# Patient Record
Sex: FEMALE | Race: BLACK OR AFRICAN AMERICAN | NOT HISPANIC OR LATINO | Employment: OTHER | URBAN - METROPOLITAN AREA
[De-identification: names, ages, dates, MRNs, and addresses within clinical notes are randomized per-mention and may not be internally consistent; named-entity substitution may affect disease eponyms.]

---

## 2017-04-03 ENCOUNTER — HOSPITAL ENCOUNTER (EMERGENCY)
Facility: OTHER | Age: 42
Discharge: HOME | End: 2017-04-03
Attending: EMERGENCY MEDICINE
Payer: MEDICARE

## 2017-04-03 VITALS
SYSTOLIC BLOOD PRESSURE: 128 MMHG | BODY MASS INDEX: 21.71 KG/M2 | HEART RATE: 104 BPM | TEMPERATURE: 99 F | OXYGEN SATURATION: 96 % | RESPIRATION RATE: 18 BRPM | HEIGHT: 61 IN | WEIGHT: 115 LBS | DIASTOLIC BLOOD PRESSURE: 95 MMHG

## 2017-04-03 DIAGNOSIS — T14.90XA INJURY: ICD-10-CM

## 2017-04-03 DIAGNOSIS — S46.911A RIGHT SHOULDER STRAIN, INITIAL ENCOUNTER: Primary | ICD-10-CM

## 2017-04-03 PROCEDURE — 99283 EMERGENCY DEPT VISIT LOW MDM: CPT

## 2017-04-03 RX ORDER — IBUPROFEN 800 MG/1
800 TABLET ORAL EVERY 6 HOURS PRN
Qty: 30 TABLET | Refills: 0 | Status: ON HOLD | OUTPATIENT
Start: 2017-04-03 | End: 2018-07-03

## 2017-04-03 NOTE — ED NOTES
"Pt reports right shoulder pain after "working out". Pt complains of 10 out of 10 pain, no obvious deformity noted. Pt answers questions appropriately  "

## 2017-04-03 NOTE — ED AVS SNAPSHOT
[facility]  [address] [place] [id number]               Constance Seaman   4/3/2017  3:48 PM   ED    Description:  Female : 1975   Department:  [facility]           Your Care was Coordinated By:     Provider Role From To    Fernando Benjamin II, MD Attending Provider 17 1553 --      Reason for Visit     Shoulder Injury           Diagnoses this Visit        Comments    Right shoulder strain, initial encounter    -  Primary     Injury           ED Disposition     None           To Do List           Follow-up Information     Follow up with Roman Tinsley MD In 1 week.    Specialty:  Orthopedic Surgery    Contact information:    [facility] [id number]  [phone]         These Medications        Disp Refills Start End    ibuprofen (ADVIL,MOTRIN) 800 MG tablet 30 tablet 0 4/3/2017     Take 1 tablet (800 mg total) by mouth every 6 (six) hours as needed for Pain. - Oral      [facility]     [facility] Nurse Care Line -  Assistance  Unless otherwise directed by your provider, please contact [facility], our nurse care line that is available for  assistance.     Registered nurses in the [facility] provide: appointment scheduling, clinical advisement, health education, and other advisory services.  Call: [phone] (toll free)               Medications           Message regarding Medications     Verify the changes and/or additions to your medication regime listed below are the same as discussed with your clinician today.  If any of these changes or additions are incorrect, please notify your healthcare provider.        START taking these NEW medications        Refills    ibuprofen (ADVIL,MOTRIN) 800 MG tablet 0    Sig: Take 1 tablet (800 mg total) by mouth every 6 (six) hours as needed for Pain.    Class: Print    Route: Oral      STOP taking these medications     meloxicam  "(MOBIC) 7.5 MG tablet Take 7.5 mg by mouth once daily.           Verify that the below list of medications is an accurate representation of the medications you are currently taking.  If none reported, the list may be blank. If incorrect, please contact your healthcare provider. Carry this list with you in case of emergency.           Current Medications     amlodipine (NORVASC) 10 MG tablet Take 10 mg by mouth once daily.    busPIRone (BUSPAR) 7.5 MG tablet Take 7.5 mg by mouth 3 (three) times daily.    ergocalciferol (ERGOCALCIFEROL) 50,000 unit Cap Take 50,000 Units by mouth every 7 days.    escitalopram oxalate (LEXAPRO) 10 MG tablet Take 10 mg by mouth once daily.    hydrochlorothiazide (HYDRODIURIL) 12.5 MG Tab Take 12.5 mg by mouth once daily.    ibuprofen (ADVIL,MOTRIN) 800 MG tablet Take 1 tablet (800 mg total) by mouth every 6 (six) hours as needed for Pain.    lorazepam (ATIVAN) 0.5 MG tablet Take 1 tablet (0.5 mg total) by mouth every 6 (six) hours as needed for Anxiety.    oxcarbazepine (TRILEPTAL) 600 MG Tab Take 150 mg by mouth once daily.    tramadol (ULTRAM) 50 mg tablet Take 50 mg by mouth every 6 (six) hours as needed for Pain.           Clinical Reference Information           Your Vitals Were     BP Pulse Temp Resp Height Weight    128/95 (BP Location: Left arm, Patient Position: Sitting) 104 98.5 °F (36.9 °C) (Oral) 18 5' 1" (1.549 m) 52.2 kg (115 lb)    SpO2 BMI             96% 21.73 kg/m2         Allergies as of 4/3/2017        Reactions    Avelox [Moxifloxacin] Anaphylaxis      Immunizations Administered on Date of Encounter - 4/3/2017     None      ED Micro, Lab, POCT     Start Ordered       Status Ordering Provider    04/03/17 1555 04/03/17 1550    Once,   Status:  Canceled      Canceled       ED Imaging Orders     Start Ordered       Status Ordering Provider    04/03/17 1555 04/03/17 1551  X-Ray Shoulder Trauma Right  1 time imaging      Final result       Discharge References/Attachments  "    SHOULDER SPRAIN  (ENGLISH)      [facility] Sign-Up     Activating your [facility] account is as easy as 1-2-3!     1) Visit [email or web address], select Sign Up Now, enter this activation code and your date of birth, then select Next.  [email or web address]  Expires: 5/18/2017  4:55 PM      2) Create a username and password to use when you visit [facility] in the future and select a security question in case you lose your password and select Next.    3) Enter your e-mail address and click Sign Up!    Additional Information  If you have questions, please e-mail [email or web address] or call [phone] to talk to our [facility] staff. Remember, [facility] is NOT to be used for urgent needs. For medical emergencies, dial 911.          [facility] complies with applicable Federal civil rights laws and does not discriminate on the basis of race, color, national origin, age, disability, or sex.        Language Assistance Services     ATTENTION: Language assistance services are available, free of charge. Please call [phone].      ATENCIÓN: Si habla español, tiene a holly disposición servicios gratuitos de asistencia lingüística. Llame al [phone].     CHÚ Ý: N?u b?n nói Ti?ng Vi?t, có các d?ch v? h? tr? ngôn ng? mi?n phí dành cho b?n. G?i s? [phone].

## 2017-04-03 NOTE — ED PROVIDER NOTES
"Encounter Date: 4/3/2017    SCRIBE #1 NOTE: I, Mariah Chester, am scribing for, and in the presence of, Dr. Benjamin.       History     Chief Complaint   Patient presents with    Shoulder Injury     pt reports having right shoulder surgery 2016; today while working out right shoulder "popped" out; ROM decreased d/t pain; deformity noted     Review of patient's allergies indicates:   Allergen Reactions    Avelox [moxifloxacin] Anaphylaxis     HPI Comments:   Time seen by provider: 4:17 PM    The patient is a 41 y.o. female with HTN, bipolar disorder, depression, and anxiety who presents to the ED with right shoulder injury. She states that her shoulder "popped" out while doing "rough" exercises this morning. The patient denies having an upcoming Orthopedic appointment, arthralgias, myalgias, or any other symptoms at this time. Her PCP is Dr. Radha Moreno. She has a SHx including a rotator cuff repair that was performed by Dr. Oquendo 2 years ago.    The history is provided by the patient.     Past Medical History:   Diagnosis Date    Anxiety     Depression     Hypertension      Past Surgical History:   Procedure Laterality Date    ABDOMINAL SURGERY       SECTION      choleycystectomy      COSMETIC SURGERY      HYSTERECTOMY      ROTATOR CUFF REPAIR       History reviewed. No pertinent family history.  Social History   Substance Use Topics    Smoking status: Never Smoker    Smokeless tobacco: None    Alcohol use No     Review of Systems   Constitutional: Negative for chills and fever.   HENT: Negative for nosebleeds.    Eyes: Negative for visual disturbance.   Respiratory: Negative for cough and shortness of breath.    Cardiovascular: Negative for chest pain and palpitations.   Gastrointestinal: Negative for abdominal pain, diarrhea, nausea and vomiting.   Genitourinary: Negative for dysuria and hematuria.   Musculoskeletal: Negative for arthralgias (right shoulder) and myalgias.        " Positive for injury (right shoulder).   Skin: Negative for rash.   Neurological: Negative for seizures, syncope and headaches.     Physical Exam   Initial Vitals   BP Pulse Resp Temp SpO2   04/03/17 1546 04/03/17 1546 04/03/17 1546 04/03/17 1546 04/03/17 1546   128/95 104 18 98.5 °F (36.9 °C) 96 %     Physical Exam    Nursing note and vitals reviewed.  Constitutional: She appears well-developed and well-nourished. She is not diaphoretic. No distress.   HENT:   Head: Normocephalic and atraumatic.   Mouth/Throat: Oropharynx is clear and moist.   Eyes: Conjunctivae are normal.   Neck: Neck supple.   Abdominal: Soft. She exhibits no distension. There is no tenderness.   Musculoskeletal: Normal range of motion.        Right shoulder: She exhibits normal range of motion, no tenderness and no deformity.   Full ROM of both active and passive. No focal bony tenderness, ecchymosis, muscular tenderness, or unusual bulging of bicep muscle.   Neurological: She is alert and oriented to person, place, and time.   Skin: No rash noted. No erythema.   Psychiatric:   Patient has unusual affect. She has loose associations but no AH/VH.        ED Course   Procedures   Imaging Results         X-Ray Shoulder Trauma Right (Final result) Result time:  04/03/17 16:25:57    Final result by Garrison Perry III, MD (04/03/17 16:25:57)    Narrative:    3 views: No fracture dislocation bone destruction seen.      Electronically signed by: GARRISON PERRY  Date:     04/03/17  Time:    16:25                X-Rays:   Independently Interpreted Readings:   Other Readings:  XR Right Shoulder: No fracture. Good alignment.     Medical Decision Making:   History:   Old Medical Records: I decided to obtain old medical records.  Clinical Tests:   Radiological Study: Ordered and Reviewed            Scribe Attestation:   Scribe #1: I performed the above scribed service and the documentation accurately describes the services I performed. I attest to the  "accuracy of the note.    Attending Attestation:           Physician Attestation for Scribe:  Physician Attestation Statement for Scribe #1: I, Dr. Benjamin, reviewed documentation, as scribed by Mariah Chester in my presence, and it is both accurate and complete.                 ED Course     Patient presents with right shoulder pain which occurred while she was doing exercises.  She reports hearing/feeling a "popping".  She had rotator cuff surgery on this nearly 2 years ago.  No longer follows with her orthopedist.  On exam the patient does not have any focal bony tenderness.  She also does not have exam that would suggest biceps tendon rupture, although this cannot be entirely excluded, nor can other tendon/soft tissue injury.  X-ray negative for fracture, dislocation.  The patient has good range of motion.  I do not think she needs immobilization.  Recommend anti-inflammatories, follow-up with orthopedics or primary care, especially symptoms persist    Clinical Impression:     1. Right shoulder strain, initial encounter    2. Injury          Disposition:   Disposition: Discharged  Condition: Stable       Fernando Benjamin II, MD  04/03/17 4412    "

## 2017-12-16 ENCOUNTER — HOSPITAL ENCOUNTER (EMERGENCY)
Facility: HOSPITAL | Age: 42
Discharge: HOME | End: 2017-12-16
Attending: EMERGENCY MEDICINE
Payer: MEDICARE

## 2017-12-16 VITALS
OXYGEN SATURATION: 100 % | BODY MASS INDEX: 21.71 KG/M2 | DIASTOLIC BLOOD PRESSURE: 87 MMHG | RESPIRATION RATE: 16 BRPM | SYSTOLIC BLOOD PRESSURE: 162 MMHG | HEART RATE: 91 BPM | HEIGHT: 61 IN | TEMPERATURE: 99 F | WEIGHT: 115 LBS

## 2017-12-16 DIAGNOSIS — M25.511 CHRONIC RIGHT SHOULDER PAIN: Primary | ICD-10-CM

## 2017-12-16 DIAGNOSIS — G89.29 CHRONIC RIGHT SHOULDER PAIN: Primary | ICD-10-CM

## 2017-12-16 PROCEDURE — 25000003 PHARM REV CODE 250: Performed by: EMERGENCY MEDICINE

## 2017-12-16 PROCEDURE — 99284 EMERGENCY DEPT VISIT MOD MDM: CPT | Mod: ,,, | Performed by: EMERGENCY MEDICINE

## 2017-12-16 PROCEDURE — 99283 EMERGENCY DEPT VISIT LOW MDM: CPT

## 2017-12-16 RX ORDER — QUETIAPINE FUMARATE 100 MG/1
100 TABLET, FILM COATED ORAL DAILY
Qty: 30 TABLET | Refills: 0 | Status: ON HOLD | OUTPATIENT
Start: 2017-12-16 | End: 2018-07-03

## 2017-12-16 RX ORDER — LORAZEPAM 0.5 MG/1
0.5 TABLET ORAL
Status: COMPLETED | OUTPATIENT
Start: 2017-12-16 | End: 2017-12-16

## 2017-12-16 RX ORDER — AMLODIPINE BESYLATE 5 MG/1
5 TABLET ORAL DAILY
Qty: 30 TABLET | Refills: 0 | Status: SHIPPED | OUTPATIENT
Start: 2017-12-16 | End: 2018-12-16

## 2017-12-16 RX ADMIN — LORAZEPAM 0.5 MG: 0.5 TABLET ORAL at 12:12

## 2017-12-16 NOTE — ED PROVIDER NOTES
"Encounter Date: 2017    SCRIBE #1 NOTE: I, Wang Traylor, am scribing for, and in the presence of,  Dr. Ann. I have scribed the entire note.   SCRIBE #2 NOTE: I, Abdullahi Perdue, am scribing for, and in the presence of, Dr. Ann.     History     Chief Complaint   Patient presents with    Abscess     Time patient was seen by the provider: 12:00 PM       The patient is a 42 y.o. female with hx of: depression, anxiety, right rotator cuff repair surgery (), and HTN who presents to the ED with complaint of right shoulder pain described as a "knot in her right shoulder" with initial onset about 1 year ago, but worse for the last month. Patient describes tearing pain along her right side, associated with sensation of fluid draining along her right side, which the patient claims ultimately drains from her vagina. Patient states that the drainage is clear, increases after drinking water, and she is unsure whether or not this is urine. She also endorses developing right shoulder and right jaw tightness whenever she chews. She also states developing a small area of fluid collection to the her right face, which she states that she manually opened and drained 4 days ago. Patient denies new trauma to her right shoulder. Patient denies any urinary problems and N/D/V. Patient reports that Nyquil alleviates the sensation of drainage. She states that she has been off her Seroquel for 1 month.       The history is provided by the patient and medical records.     Review of patient's allergies indicates:   Allergen Reactions    Avelox [moxifloxacin] Anaphylaxis     Past Medical History:   Diagnosis Date    Anxiety     Depression     Hypertension      Past Surgical History:   Procedure Laterality Date    ABDOMINAL SURGERY       SECTION      choleycystectomy      COSMETIC SURGERY      HYSTERECTOMY      ROTATOR CUFF REPAIR       History reviewed. No pertinent family history.  Social History   Substance Use Topics "    Smoking status: Never Smoker    Smokeless tobacco: Never Used    Alcohol use No     Review of Systems   Constitutional: Negative for fever.   HENT: Negative for sore throat.         Swelling in right side of jaw   Respiratory: Negative for shortness of breath.    Cardiovascular: Negative for chest pain.   Gastrointestinal: Negative for diarrhea, nausea and vomiting.   Genitourinary: Negative for dysuria and vaginal discharge.   Musculoskeletal: Negative for back pain.        Subjective right shoulder pain. Subjective right flank/ribs pain.   Skin: Negative for rash.   Neurological: Negative for weakness.   Hematological: Does not bruise/bleed easily.       Physical Exam       Initial Vitals [12/16/17 1101]   BP Pulse Resp Temp SpO2   (!) 162/87 (!) 115 16 98.8 °F (37.1 °C) 100 %      MAP       112         Physical Exam    Nursing note and vitals reviewed.    Appearance: Anxious and tearful.  Skin: 1 cm diameter of scab on right cheek, no tenderness to palpitation. No fluctuance. No surrounding redness.   Eyes: No conjunctival injection.  ENT: Oropharynx clear. No swelling on inside of the cheek or gingival swelling or redness. No swelling or abscess.   Chest: Clear to auscultation bilaterally.  Good air movement.  No wheezes.  No rhonchi.  Cardiovascular: Regular rate and rhythm.  No murmurs. No gallops. No rubs.  Abdomen: Soft.  Not distended.  Nontender.  No guarding.  No rebound. No Masses.  Musculoskeletal: Good range of motion all joints.  No deformities.  Neck supple.  No meningismus. Jaw opens and closes with no difficulty. No bone abnormalities.  Right shoulder with full ROM, no deformity, no obvious swelling or fluid collection.  Neurologic: Motor intact.  Sensation intact.  Cerebellar intact.  Cranial nerves intact.  Mental Status:  Alert and oriented x 3.  Appropriate, conversant.    ED Course   Procedures  Labs Reviewed - No data to display              Medical Decision Making:   History:   Old  Medical Records: I decided to obtain old medical records.  Initial Assessment:   42 y.o. female with multiple complaints centralized around right shoulder pain, and sensation of fluid draining from her right shoulder and jaw. There is no sign of infection in either the jaw or shoulder and no sign of drainage anywhere else. Patient claims that she is having drainage of this fluid from her vagina, but denies any extra liquid coming out of her vagina, denies any other urinary symptoms, signs of infection, pelvic pain, or anything that would suggest a gynecological etiology. I do not think that her shoulder pain is related to any sort of vaginal discharge. Patient is slightly tachycardic likely from anxiety and crying. She knows that she has been off of her Seroquel for 1 month and has been taking Lexapro intermittently. Has an appointment with her PCP soon, but is requesting refill of her Amlodipine and Seroquel which I think is reasonable at this time. She is also requesting Ativan in the ED for her anxiety and shoulder pain. Otherwise she is hemodynamically stable. Advised patient to keep her appointment with her PCP on Tuesday. Also advised to follow with Orthopedics. Patient is amenable to this plan. Will discharge home. Advised pt to follow up with PCP or return if concerning symptoms arise. Pt understands and agrees with plan. Will d/c home.              Scribe Attestation:   Scribe #1: I performed the above scribed service and the documentation accurately describes the services I performed. I attest to the accuracy of the note.  Scribe #2: I performed the above scribed service and the documentation accurately describes the services I performed. I attest to the accuracy of the note.            ED Course      Clinical Impression:   The encounter diagnosis was Chronic right shoulder pain.    Disposition:   Disposition: Discharged  Condition: Stable                        Berhane Ann MD  12/16/17 2243

## 2017-12-16 NOTE — ED TRIAGE NOTES
"Presents to ER with complaint of:  A knot in her right shoulder that she massages to make it drain then it comes out of her vagina."  Patient noted to have a scabbed over lesion noted to her right cheek area.      GENERAL: The patient is a small framed disheveled appearing female in no apparent distress. Alert and oriented x4.  Crying and anxious.                                                HEENT: Head is normocephalic and atraumatic. Extraocular muscles are intact. Pupils are equal, round, and reactive to light and accommodation. Nares appeared normal. Mouth is well hydrated and without lesions. Mucous membranes are moist. Posterior pharynx clear of any exudate or lesions.    NECK: Supple. No carotid bruits. No lymphadenopathy or thyromegaly.    LUNGS: Clear to auscultation.    HEART: Regular rate and rhythm without murmur.     ABDOMEN: Soft, nontender, and nondistended. Positive bowel sounds. No hepatosplenomegaly was noted.     EXTREMITIES: Without any cyanosis, clubbing, rash, lesions or edema.     NEUROLOGIC: Cranial nerves II through XII are grossly intact.     PSYCHIATRIC: Flat affect, but denies suicidal or homicidal ideations.    SKIN: No ulceration or induration present.                                                "

## 2018-07-02 ENCOUNTER — HOSPITAL ENCOUNTER (EMERGENCY)
Facility: HOSPITAL | Age: 43
Discharge: PSYCHIATRIC OR SUBSTANCE USE FACILITY | End: 2018-07-02
Attending: EMERGENCY MEDICINE
Payer: MEDICARE

## 2018-07-02 VITALS
DIASTOLIC BLOOD PRESSURE: 66 MMHG | TEMPERATURE: 98 F | WEIGHT: 104 LBS | OXYGEN SATURATION: 100 % | RESPIRATION RATE: 18 BRPM | HEART RATE: 95 BPM | HEIGHT: 61 IN | BODY MASS INDEX: 19.63 KG/M2 | SYSTOLIC BLOOD PRESSURE: 156 MMHG

## 2018-07-02 DIAGNOSIS — Z00.8 MEDICAL CLEARANCE FOR PSYCHIATRIC ADMISSION: ICD-10-CM

## 2018-07-02 DIAGNOSIS — R10.2 PELVIC PAIN: ICD-10-CM

## 2018-07-02 DIAGNOSIS — M25.519 SHOULDER PAIN: ICD-10-CM

## 2018-07-02 DIAGNOSIS — F23 ACUTE PSYCHOSIS: Primary | ICD-10-CM

## 2018-07-02 PROBLEM — F31.10 BIPOLAR I DISORDER WITH MANIA: Status: ACTIVE | Noted: 2018-07-02

## 2018-07-02 PROBLEM — F29 PSYCHOSIS: Status: ACTIVE | Noted: 2018-07-02

## 2018-07-02 LAB
ALBUMIN SERPL BCP-MCNC: 4.2 G/DL
ALP SERPL-CCNC: 60 U/L
ALT SERPL W/O P-5'-P-CCNC: 24 U/L
AMPHET+METHAMPHET UR QL: NEGATIVE
ANION GAP SERPL CALC-SCNC: 10 MMOL/L
APAP SERPL-MCNC: <3 UG/ML
AST SERPL-CCNC: 21 U/L
BARBITURATES UR QL SCN>200 NG/ML: NEGATIVE
BASOPHILS # BLD AUTO: 0.03 K/UL
BASOPHILS NFR BLD: 0.7 %
BENZODIAZ UR QL SCN>200 NG/ML: NEGATIVE
BILIRUB SERPL-MCNC: 0.4 MG/DL
BILIRUB UR QL STRIP: NEGATIVE
BUN SERPL-MCNC: 9 MG/DL
BZE UR QL SCN: NEGATIVE
CALCIUM SERPL-MCNC: 10.1 MG/DL
CANNABINOIDS UR QL SCN: NORMAL
CHLORIDE SERPL-SCNC: 108 MMOL/L
CK SERPL-CCNC: 86 U/L
CLARITY UR REFRACT.AUTO: ABNORMAL
CO2 SERPL-SCNC: 25 MMOL/L
COLOR UR AUTO: ABNORMAL
CREAT SERPL-MCNC: 0.8 MG/DL
CREAT UR-MCNC: 44 MG/DL
DIFFERENTIAL METHOD: ABNORMAL
EOSINOPHIL # BLD AUTO: 0.1 K/UL
EOSINOPHIL NFR BLD: 1.3 %
ERYTHROCYTE [DISTWIDTH] IN BLOOD BY AUTOMATED COUNT: 13.6 %
EST. GFR  (AFRICAN AMERICAN): >60 ML/MIN/1.73 M^2
EST. GFR  (NON AFRICAN AMERICAN): >60 ML/MIN/1.73 M^2
ETHANOL SERPL-MCNC: <10 MG/DL
GLUCOSE SERPL-MCNC: 80 MG/DL
GLUCOSE UR QL STRIP: NEGATIVE
HCT VFR BLD AUTO: 36.9 %
HGB BLD-MCNC: 12.3 G/DL
HGB UR QL STRIP: NEGATIVE
IMM GRANULOCYTES # BLD AUTO: 0.01 K/UL
IMM GRANULOCYTES NFR BLD AUTO: 0.2 %
KETONES UR QL STRIP: NEGATIVE
LEUKOCYTE ESTERASE UR QL STRIP: NEGATIVE
LYMPHOCYTES # BLD AUTO: 1.8 K/UL
LYMPHOCYTES NFR BLD: 40.5 %
MCH RBC QN AUTO: 30.5 PG
MCHC RBC AUTO-ENTMCNC: 33.3 G/DL
MCV RBC AUTO: 92 FL
METHADONE UR QL SCN>300 NG/ML: NEGATIVE
MONOCYTES # BLD AUTO: 0.3 K/UL
MONOCYTES NFR BLD: 6.2 %
NEUTROPHILS # BLD AUTO: 2.3 K/UL
NEUTROPHILS NFR BLD: 51.1 %
NITRITE UR QL STRIP: NEGATIVE
NRBC BLD-RTO: 0 /100 WBC
OPIATES UR QL SCN: NEGATIVE
PCP UR QL SCN>25 NG/ML: NEGATIVE
PH UR STRIP: 7 [PH] (ref 5–8)
PLATELET # BLD AUTO: 352 K/UL
PMV BLD AUTO: 11.2 FL
POTASSIUM SERPL-SCNC: 3.4 MMOL/L
PROT SERPL-MCNC: 7.9 G/DL
PROT UR QL STRIP: NEGATIVE
RBC # BLD AUTO: 4.03 M/UL
SALICYLATES SERPL-MCNC: <5 MG/DL
SODIUM SERPL-SCNC: 143 MMOL/L
SP GR UR STRIP: 1.01 (ref 1–1.03)
TOXICOLOGY INFORMATION: NORMAL
TSH SERPL DL<=0.005 MIU/L-ACNC: 0.46 UIU/ML
URN SPEC COLLECT METH UR: ABNORMAL
UROBILINOGEN UR STRIP-ACNC: NEGATIVE EU/DL
WBC # BLD AUTO: 4.52 K/UL

## 2018-07-02 PROCEDURE — 81003 URINALYSIS AUTO W/O SCOPE: CPT | Mod: 59

## 2018-07-02 PROCEDURE — 80307 DRUG TEST PRSMV CHEM ANLYZR: CPT

## 2018-07-02 PROCEDURE — 80329 ANALGESICS NON-OPIOID 1 OR 2: CPT

## 2018-07-02 PROCEDURE — 80320 DRUG SCREEN QUANTALCOHOLS: CPT

## 2018-07-02 PROCEDURE — 80053 COMPREHEN METABOLIC PANEL: CPT

## 2018-07-02 PROCEDURE — 85025 COMPLETE CBC W/AUTO DIFF WBC: CPT

## 2018-07-02 PROCEDURE — 25000003 PHARM REV CODE 250: Performed by: EMERGENCY MEDICINE

## 2018-07-02 PROCEDURE — 99284 EMERGENCY DEPT VISIT MOD MDM: CPT | Mod: ,,, | Performed by: EMERGENCY MEDICINE

## 2018-07-02 PROCEDURE — 93010 ELECTROCARDIOGRAM REPORT: CPT | Mod: ,,, | Performed by: INTERNAL MEDICINE

## 2018-07-02 PROCEDURE — 96372 THER/PROPH/DIAG INJ SC/IM: CPT

## 2018-07-02 PROCEDURE — 84443 ASSAY THYROID STIM HORMONE: CPT

## 2018-07-02 PROCEDURE — 99285 EMERGENCY DEPT VISIT HI MDM: CPT | Mod: 25

## 2018-07-02 PROCEDURE — 63600175 PHARM REV CODE 636 W HCPCS: Performed by: EMERGENCY MEDICINE

## 2018-07-02 PROCEDURE — 93005 ELECTROCARDIOGRAM TRACING: CPT

## 2018-07-02 PROCEDURE — 82550 ASSAY OF CK (CPK): CPT

## 2018-07-02 RX ORDER — LORAZEPAM 1 MG/1
1 TABLET ORAL EVERY 4 HOURS PRN
Status: DISCONTINUED | OUTPATIENT
Start: 2018-07-02 | End: 2018-07-02 | Stop reason: HOSPADM

## 2018-07-02 RX ORDER — POTASSIUM CHLORIDE 20 MEQ/1
40 TABLET, EXTENDED RELEASE ORAL
Status: DISCONTINUED | OUTPATIENT
Start: 2018-07-02 | End: 2018-07-02 | Stop reason: HOSPADM

## 2018-07-02 RX ORDER — HALOPERIDOL 5 MG/ML
5 INJECTION INTRAMUSCULAR EVERY 6 HOURS PRN
Status: DISCONTINUED | OUTPATIENT
Start: 2018-07-02 | End: 2018-07-02 | Stop reason: HOSPADM

## 2018-07-02 RX ADMIN — LORAZEPAM 1 MG: 1 TABLET ORAL at 03:07

## 2018-07-02 RX ADMIN — HALOPERIDOL LACTATE 5 MG: 5 INJECTION, SOLUTION INTRAMUSCULAR at 03:07

## 2018-07-02 NOTE — HPI
"Ms. Seaman 41 y/o female with history of HTN, Bipolar disorder, depression, anxiety presents to the ER  With symptomatic complaints, manic, and psychotic. The pt is sitting in a dark room complaining of a migraine and crying prior to interview. She reports coming to the hospital via her daughter with a "broken jaw, fractured clavicle, and broken sacrum". The pt complains of her symptoms being dismissed and is "angry" that she is going to be PEC'd. She endorses a past psychiatric history of Major Depression and Borderline Personality Disorder. The pt is elevated, exhibits pressured speech, flight of ideas, and crying throughout interview. She insists on "doing exercises and stretching her pelvis" during our conversation. The pt denies taking psychotropic medications and does not have an outpatient psychiatrist. She reports poor sleep, anger, and expresses persecutory beliefs about hospitals, doctors, and staff.  "

## 2018-07-02 NOTE — CONSULTS
Please see consult note by Dr. Vik Cohen MD  Psychiatry  [facility]

## 2018-07-02 NOTE — ED NOTES
Patient given her tray.  She did not want it.  Cont to lay in bed.  Patient also informed where she would be discharged.  She appeared irritated.  Will cont to monitor.

## 2018-07-02 NOTE — ED NOTES
Patient transported to via wheelchair with 2 security and nurse escort.  Patient belongings sent with patient and PEC.

## 2018-07-02 NOTE — ED NOTES
Admit packet faxed to [facility], [facility], and [facility]

## 2018-07-02 NOTE — ED TRIAGE NOTES
"Pt flight of ideas and tearful during triage. Pt states "Thesupression of my medication is causing my pelvic to move from where it's supposed to be" and "There are arm nerves tat are wrapping around my brain" and "There are balls in my arms that are forcing me to move my arms".  Pt c/o right sided back pain. Pt denies any SI or HI at this time. Pt states she is not taking any of her prescribed medications. Pt denies any depression or anxiety at this time. Pt A&O x4 during triage. Pt talking about multiple c/o and unable to stay on track despite redirection. Pt denies any weakness or numbness anywhere. Denies any chest pain, SOB, fever, chills, N/D/V. Pt moving repeatedly during triage; Unable to sit still. Pt ambulating per self without assistance.   "

## 2018-07-02 NOTE — SUBJECTIVE & OBJECTIVE
Patient History           Medical as of 2018     Past Medical History     Diagnosis Date Comments Source    Anxiety -- -- Provider    Depression -- -- Provider    Hypertension -- -- Provider          Pertinent Negatives     Diagnosis Date Noted Comments Source    Cancer 2016 -- Provider    CHF (congestive heart failure) 2016 -- Provider    COPD (chronic obstructive pulmonary disease) 2016 -- Provider    Diabetes mellitus 2016 -- Provider    Renal disorder 2016 -- Provider    Seizures 2016 -- Provider    Stroke 2016 -- Provider                  Surgical as of 2018     Past Surgical History     Procedure Laterality Date Comments Source    HYSTERECTOMY -- -- -- Provider     SECTION -- -- -- Provider    ABDOMINAL SURGERY -- -- -- Provider    COSMETIC SURGERY -- -- -- Provider    choleycystectomy [Other] -- -- -- Provider    ROTATOR CUFF REPAIR -- -- -- Provider                  Family as of 2018    **None**           Tobacco Use as of 2018     Smoking Status Smoking Start Date Smoking Quit Date Packs/day Years Used    Never Smoker -- -- -- --    Types Comments Smokeless Tobacco Status Smokeless Tobacco Quit Date Source    -- -- Never Used -- Provider            Alcohol Use as of 2018     Alcohol Use Drinks/Week Alcohol/Week Comments Source    No -- -- -- Provider            Drug Use as of 2018     Drug Use Types Frequency Comments Source    Yes  Marijuana -- twice daily Provider            Sexual Activity as of 2018     Sexually Active Birth Control Partners Comments Source    -- -- -- -- Provider            Activities of Daily Living as of 2018    **None**           Social Documentation as of 2018    **None**           Occupational as of 2018    **None**           Socioeconomic as of 2018     Marital Status Spouse Name Number of Children Years Education Preferred Language Ethnicity Race Source     -- -- -- English  /Black Black or  --         Pertinent History Q A Comments    as of 2018 Lives with      Place in Birth Order      Lives in      Number of Siblings      Raised by      Legal Involvement      Childhood Trauma      Criminal History of      Financial Status      Highest Level of Education      Does patient have access to a firearm?       Service      Primary Leisure Activity      Spirituality       Past Medical History:   Diagnosis Date    Anxiety     Depression     Hypertension      Past Surgical History:   Procedure Laterality Date    ABDOMINAL SURGERY       SECTION      choleycystectomy      COSMETIC SURGERY      HYSTERECTOMY      ROTATOR CUFF REPAIR       Family History     None        Social History Main Topics    Smoking status: Never Smoker    Smokeless tobacco: Never Used    Alcohol use No    Drug use: Yes     Types: Marijuana      Comment: twice daily    Sexual activity: Not on file     Review of patient's allergies indicates:   Allergen Reactions    Avelox [moxifloxacin] Anaphylaxis       No current facility-administered medications on file prior to encounter.      Current Outpatient Prescriptions on File Prior to Encounter   Medication Sig    amLODIPine (NORVASC) 5 MG tablet Take 1 tablet (5 mg total) by mouth once daily.    busPIRone (BUSPAR) 7.5 MG tablet Take 7.5 mg by mouth 3 (three) times daily.    ergocalciferol (ERGOCALCIFEROL) 50,000 unit Cap Take 50,000 Units by mouth every 7 days.    escitalopram oxalate (LEXAPRO) 10 MG tablet Take 10 mg by mouth once daily.    hydrochlorothiazide (HYDRODIURIL) 12.5 MG Tab Take 12.5 mg by mouth once daily.    ibuprofen (ADVIL,MOTRIN) 800 MG tablet Take 1 tablet (800 mg total) by mouth every 6 (six) hours as needed for Pain.    lorazepam (ATIVAN) 0.5 MG tablet Take 1 tablet (0.5 mg total) by mouth every 6 (six) hours as needed for Anxiety.    oxcarbazepine (TRILEPTAL) 600 MG Tab Take 150 mg  "by mouth once daily.    QUEtiapine (SEROQUEL) 100 MG Tab Take 1 tablet (100 mg total) by mouth once daily.    tramadol (ULTRAM) 50 mg tablet Take 50 mg by mouth every 6 (six) hours as needed for Pain.     Psychotherapeutics     Start     Stop Route Frequency Ordered    07/02/18 1531  LORazepam tablet 1 mg      -- Oral Every 4 hours PRN 07/02/18 1431    07/02/18 1531  haloperidol lactate injection 5 mg      -- IM Every 6 hours PRN 07/02/18 1431        Review of Systems   All other systems reviewed and are negative.    Strengths and Liabilities: Liability: Patient is defensive., Liability: Patient is impulsive., Liability: Patient has poor judgment    Objective:     Vital Signs (Most Recent):  Temp: 98.5 °F (36.9 °C) (07/02/18 1440)  Pulse: 80 (07/02/18 1440)  Resp: 17 (07/02/18 1440)  BP: 127/68 (07/02/18 1440)  SpO2: 100 % (07/02/18 1440) Vital Signs (24h Range):  Temp:  [98.3 °F (36.8 °C)-98.5 °F (36.9 °C)] 98.5 °F (36.9 °C)  Pulse:  [80-98] 80  Resp:  [17] 17  SpO2:  [100 %] 100 %  BP: (127-137)/(68-72) 127/68     Height: 5' 1" (154.9 cm)  Weight: 47.2 kg (104 lb)  Body mass index is 19.65 kg/m².    No intake or output data in the 24 hours ending 07/02/18 1552    Physical Exam   Psychiatric:   Mental Status Exam:  Appearance: disheveled, lying in bed  Behavior/Cooperation: limited/ appropriate reluctant to participate, psychomotor agitation, restless and fidgety   Speech: appropriate rate, volume and tone loud, pressured, rapid  Language: uses words appropriately; NO aphasia or dysarthria  Mood: anxious, irritable  Affect:  congruent with mood and appropriate to situation/content   Thought Process: flight of ideas, racing, illogical  Thought Content: delusions:believes she has multiple fractures yes, Denies SI/HI/AVH  Level of Consciousness: Alert and Oriented x3  Memory:  Intact to conversation  Fund of Knowledge: appears adequate  Insight: Impaired  Judgment: Impaired          Significant Labs:   Last 24 " Hours:   Recent Lab Results       07/02/18  1259 07/02/18  1246      Benzodiazepines Negative      Methadone metabolites Negative      Phencyclidine Negative      Immature Granulocytes  0.2     Immature Grans (Abs)  0.01  Comment:  Mild elevation in immature granulocytes is non specific and   can be seen in a variety of conditions including stress response,   acute inflammation, trauma and pregnancy. Correlation with other   laboratory and clinical findings is essential.       Acetaminophen (Tylenol), Serum  <3.0  Comment:  Toxic Levels:  Adults (4 hr post-ingestion).........>150 ug/mL  Adults (12 hr post-ingestion)........>40 ug/mL  Peds (2 hr post-ingestion, liquid)...>225 ug/mL  (L)     Albumin  4.2     Alcohol, Medical, Serum  <10     Alkaline Phosphatase  60     ALT  24     Amphetamine Screen, Ur Negative      Anion Gap  10     Appearance, UA Hazy(A)      AST  21     Barbiturate Screen, Ur Negative      Baso #  0.03     Basophil%  0.7     Bilirubin (UA) Negative      Total Bilirubin  0.4  Comment:  For infants and newborns, interpretation of results should be based  on gestational age, weight and in agreement with clinical  observations.  Premature Infant recommended reference ranges:  Up to 24 hours.............<8.0 mg/dL  Up to 48 hours............<12.0 mg/dL  3-5 days..................<15.0 mg/dL  6-29 days.................<15.0 mg/dL       BUN, Bld  9     Calcium  10.1     Chloride  108     CO2  25     Cocaine (Metab.) Negative      Color, UA Straw      CPK  86     Creatinine  0.8     Creatinine, Random Ur 44.0  Comment:  The random urine reference ranges provided were established   for 24 hour urine collections.  No reference ranges exist for  random urine specimens.  Correlate clinically.        Differential Method  Automated     eGFR if African American  >60.0     eGFR if non   >60.0  Comment:  Calculation used to obtain the estimated glomerular filtration  rate (eGFR) is the CKD-EPI  equation.        Eos #  0.1     Eosinophil%  1.3     Glucose  80     Glucose, UA Negative      Gran # (ANC)  2.3     Gran%  51.1     Hematocrit  36.9(L)     Hemoglobin  12.3     Ketones, UA Negative      Leukocytes, UA Negative      Lymph #  1.8     Lymph%  40.5     MCH  30.5     MCHC  33.3     MCV  92     Mono #  0.3     Mono%  6.2     MPV  11.2     Nitrite, UA Negative      nRBC  0     Occult Blood UA Negative      Opiate Scrn, Ur Negative      pH, UA 7.0      Platelets  352(H)     Potassium  3.4(L)     Total Protein  7.9     Protein, UA Negative  Comment:  Recommend a 24 hour urine protein or a urine   protein/creatinine ratio if globulin induced proteinuria is  clinically suspected.        RBC  4.03     RDW  13.6     Salicylate Lvl  <5.0  Comment:  Toxic:  30.0 - 70.0 mg/dl  Lethal: >70.0 mg/dl  (L)     Sodium  143     Specific Gravity, UA 1.010      Specimen UA Urine, Clean Catch      Marijuana (THC) Metabolite Presumptive Positive      Toxicology Information SEE COMMENT  Comment:  This screen includes the following classes of drugs at the   listed cut-off:  Benzodiazepines                  200 ng/ml  Methadone                        300 ng/ml  Cocaine metabolite               300 ng/ml  Opiates                          300 ng/ml  Barbiturates                     200 ng/ml  Amphetamines                    1000 ng/ml  Marijuana metabs (THC)            50 ng/ml  Phencyclidine (PCP)               25 ng/ml  High concentrations of Diphenhydramine may cross-react with  Phencyclidine PCP screening immunoassay giving a false   positive result.  High concentrations of Methylenedioxymethamphetamine (MDMA aka  Ectasy) and other structurally similar compounds may cross-   react with the Amphetamine/Methamphetamine screening   immunoassay giving a false positive result.  A metabolite of the anti-HIV drug Sustiva () may cause  false positive results in the Marijuana metabolite (THC)   screening assay.  Note: This  exception list includes only more common   interferants in toxicology screen testing.  Because of many   cross-reactantspositive results on toxicology drug screens   should be confirmed whenever results do not correlate with   clinical presentation.  This report is intended for use in clinical monitoring and  management of patients. It is not intended for use in   employment related drug testing.  Because of any cross-reactants, positive results on toxicology  drug screens should be confirmed whenever results do not  correlate with clinical presentation.  Presumptive positive results are unconfirmed and may be used   only for medical purposes.        TSH  0.464     Urobilinogen, UA Negative      WBC  4.52           Significant Imaging: I have reviewed all pertinent imaging results/findings within the past 24 hours.

## 2018-07-02 NOTE — CONSULTS
"[facility]  Psychiatry  Consult Note    Patient Name: Constance Seaman  MRN: [id number]   Code Status: No Order  Admission Date: 7/2/2018  Hospital Length of Stay: 0 days  Attending Physician: Paresh Allen MD  Primary Care Provider: Sebastian De Leon MD    Current Legal Status: PEC    Patient information was obtained from patient and ER records.   Consults  Subjective:     Principal Problem:<principal problem not specified>    Chief Complaint:  Faviola     HPI: Ms. Seaman 43 y/o female with history of HTN, Bipolar disorder, depression, anxiety presents to the ER  With symptomatic complaints, manic, and psychotic. The pt is sitting in a dark room complaining of a migraine and crying prior to interview. She reports coming to the hospital via her daughter with a "broken jaw, fractured clavicle, and broken sacrum". The pt complains of her symptoms being dismissed and is "angry" that she is going to be PEC'd. She endorses a past psychiatric history of Major Depression and Borderline Personality Disorder. The pt is elevated, exhibits pressured speech, flight of ideas, and crying throughout interview. She insists on "doing exercises and stretching her pelvis" during our conversation. The pt denies taking psychotropic medications and does not have an outpatient psychiatrist. She reports poor sleep, anger, and expresses persecutory beliefs about hospitals, doctors, and staff.    Hospital Course: No notes on file         Patient History           Medical as of 7/2/2018     Past Medical History     Diagnosis Date Comments Source    Anxiety -- -- Provider    Depression -- -- Provider    Hypertension -- -- Provider          Pertinent Negatives     Diagnosis Date Noted Comments Source    Cancer 2/20/2016 -- Provider    CHF (congestive heart failure) 2/20/2016 -- Provider    COPD (chronic obstructive pulmonary disease) 2/20/2016 -- Provider    Diabetes mellitus 2/20/2016 -- Provider    Renal disorder " 2016 -- Provider    Seizures 2016 -- Provider    Stroke 2016 -- Provider                  Surgical as of 2018     Past Surgical History     Procedure Laterality Date Comments Source    HYSTERECTOMY -- -- -- Provider     SECTION -- -- -- Provider    ABDOMINAL SURGERY -- -- -- Provider    COSMETIC SURGERY -- -- -- Provider    choleycystectomy [Other] -- -- -- Provider    ROTATOR CUFF REPAIR -- -- -- Provider                  Family as of 2018    **None**           Tobacco Use as of 2018     Smoking Status Smoking Start Date Smoking Quit Date Packs/day Years Used    Never Smoker -- -- -- --    Types Comments Smokeless Tobacco Status Smokeless Tobacco Quit Date Source    -- -- Never Used -- Provider            Alcohol Use as of 2018     Alcohol Use Drinks/Week Alcohol/Week Comments Source    No -- -- -- Provider            Drug Use as of 2018     Drug Use Types Frequency Comments Source    Yes  Marijuana -- twice daily Provider            Sexual Activity as of 2018     Sexually Active Birth Control Partners Comments Source    -- -- -- -- Provider            Activities of Daily Living as of 2018    **None**           Social Documentation as of 2018    **None**           Occupational as of 2018    **None**           Socioeconomic as of 2018     Marital Status Spouse Name Number of Children Years Education Preferred Language Ethnicity Race Source     -- -- -- English /Black Black or  --         Pertinent History Q A Comments    as of 2018 Lives with      Place in Birth Order      Lives in      Number of Siblings      Raised by      Legal Involvement      Childhood Trauma      Criminal History of      Financial Status      Highest Level of Education      Does patient have access to a firearm?       Service      Primary Leisure Activity      Spirituality       Past Medical History:   Diagnosis Date    Anxiety      Depression     Hypertension      Past Surgical History:   Procedure Laterality Date    ABDOMINAL SURGERY       SECTION      choleycystectomy      COSMETIC SURGERY      HYSTERECTOMY      ROTATOR CUFF REPAIR       Family History     None        Social History Main Topics    Smoking status: Never Smoker    Smokeless tobacco: Never Used    Alcohol use No    Drug use: Yes     Types: Marijuana      Comment: twice daily    Sexual activity: Not on file     Review of patient's allergies indicates:   Allergen Reactions    Avelox [moxifloxacin] Anaphylaxis       No current facility-administered medications on file prior to encounter.      Current Outpatient Prescriptions on File Prior to Encounter   Medication Sig    amLODIPine (NORVASC) 5 MG tablet Take 1 tablet (5 mg total) by mouth once daily.    busPIRone (BUSPAR) 7.5 MG tablet Take 7.5 mg by mouth 3 (three) times daily.    ergocalciferol (ERGOCALCIFEROL) 50,000 unit Cap Take 50,000 Units by mouth every 7 days.    escitalopram oxalate (LEXAPRO) 10 MG tablet Take 10 mg by mouth once daily.    hydrochlorothiazide (HYDRODIURIL) 12.5 MG Tab Take 12.5 mg by mouth once daily.    ibuprofen (ADVIL,MOTRIN) 800 MG tablet Take 1 tablet (800 mg total) by mouth every 6 (six) hours as needed for Pain.    lorazepam (ATIVAN) 0.5 MG tablet Take 1 tablet (0.5 mg total) by mouth every 6 (six) hours as needed for Anxiety.    oxcarbazepine (TRILEPTAL) 600 MG Tab Take 150 mg by mouth once daily.    QUEtiapine (SEROQUEL) 100 MG Tab Take 1 tablet (100 mg total) by mouth once daily.    tramadol (ULTRAM) 50 mg tablet Take 50 mg by mouth every 6 (six) hours as needed for Pain.     Psychotherapeutics     Start     Stop Route Frequency Ordered    18 1531  LORazepam tablet 1 mg      -- Oral Every 4 hours PRN 18 1431    18 1531  haloperidol lactate injection 5 mg      -- IM Every 6 hours PRN 18 1431        Review of Systems   All other systems  "reviewed and are negative.    Strengths and Liabilities: Liability: Patient is defensive., Liability: Patient is impulsive., Liability: Patient has poor judgment    Objective:     Vital Signs (Most Recent):  Temp: 98.5 °F (36.9 °C) (07/02/18 1440)  Pulse: 80 (07/02/18 1440)  Resp: 17 (07/02/18 1440)  BP: 127/68 (07/02/18 1440)  SpO2: 100 % (07/02/18 1440) Vital Signs (24h Range):  Temp:  [98.3 °F (36.8 °C)-98.5 °F (36.9 °C)] 98.5 °F (36.9 °C)  Pulse:  [80-98] 80  Resp:  [17] 17  SpO2:  [100 %] 100 %  BP: (127-137)/(68-72) 127/68     Height: 5' 1" (154.9 cm)  Weight: 47.2 kg (104 lb)  Body mass index is 19.65 kg/m².    No intake or output data in the 24 hours ending 07/02/18 1552    Physical Exam   Psychiatric:   Mental Status Exam:  Appearance: disheveled, lying in bed  Behavior/Cooperation: limited/ appropriate reluctant to participate, psychomotor agitation, restless and fidgety   Speech: appropriate rate, volume and tone loud, pressured, rapid  Language: uses words appropriately; NO aphasia or dysarthria  Mood: anxious, irritable  Affect:  congruent with mood and appropriate to situation/content   Thought Process: flight of ideas, racing, illogical  Thought Content: delusions:believes she has multiple fractures yes, Denies SI/HI/AVH  Level of Consciousness: Alert and Oriented x3  Memory:  Intact to conversation  Fund of Knowledge: appears adequate  Insight: Impaired  Judgment: Impaired          Significant Labs:   Last 24 Hours:   Recent Lab Results       07/02/18  1259 07/02/18  1246      Benzodiazepines Negative      Methadone metabolites Negative      Phencyclidine Negative      Immature Granulocytes  0.2     Immature Grans (Abs)  0.01  Comment:  Mild elevation in immature granulocytes is non specific and   can be seen in a variety of conditions including stress response,   acute inflammation, trauma and pregnancy. Correlation with other   laboratory and clinical findings is essential.       Acetaminophen " (Tylenol), Serum  <3.0  Comment:  Toxic Levels:  Adults (4 hr post-ingestion).........>150 ug/mL  Adults (12 hr post-ingestion)........>40 ug/mL  Peds (2 hr post-ingestion, liquid)...>225 ug/mL  (L)     Albumin  4.2     Alcohol, Medical, Serum  <10     Alkaline Phosphatase  60     ALT  24     Amphetamine Screen, Ur Negative      Anion Gap  10     Appearance, UA Hazy(A)      AST  21     Barbiturate Screen, Ur Negative      Baso #  0.03     Basophil%  0.7     Bilirubin (UA) Negative      Total Bilirubin  0.4  Comment:  For infants and newborns, interpretation of results should be based  on gestational age, weight and in agreement with clinical  observations.  Premature Infant recommended reference ranges:  Up to 24 hours.............<8.0 mg/dL  Up to 48 hours............<12.0 mg/dL  3-5 days..................<15.0 mg/dL  6-29 days.................<15.0 mg/dL       BUN, Bld  9     Calcium  10.1     Chloride  108     CO2  25     Cocaine (Metab.) Negative      Color, UA Straw      CPK  86     Creatinine  0.8     Creatinine, Random Ur 44.0  Comment:  The random urine reference ranges provided were established   for 24 hour urine collections.  No reference ranges exist for  random urine specimens.  Correlate clinically.        Differential Method  Automated     eGFR if African American  >60.0     eGFR if non   >60.0  Comment:  Calculation used to obtain the estimated glomerular filtration  rate (eGFR) is the CKD-EPI equation.        Eos #  0.1     Eosinophil%  1.3     Glucose  80     Glucose, UA Negative      Gran # (ANC)  2.3     Gran%  51.1     Hematocrit  36.9(L)     Hemoglobin  12.3     Ketones, UA Negative      Leukocytes, UA Negative      Lymph #  1.8     Lymph%  40.5     MCH  30.5     MCHC  33.3     MCV  92     Mono #  0.3     Mono%  6.2     MPV  11.2     Nitrite, UA Negative      nRBC  0     Occult Blood UA Negative      Opiate Scrn, Ur Negative      pH, UA 7.0      Platelets  352(H)     Potassium   3.4(L)     Total Protein  7.9     Protein, UA Negative  Comment:  Recommend a 24 hour urine protein or a urine   protein/creatinine ratio if globulin induced proteinuria is  clinically suspected.        RBC  4.03     RDW  13.6     Salicylate Lvl  <5.0  Comment:  Toxic:  30.0 - 70.0 mg/dl  Lethal: >70.0 mg/dl  (L)     Sodium  143     Specific Gravity, UA 1.010      Specimen UA Urine, Clean Catch      Marijuana (THC) Metabolite Presumptive Positive      Toxicology Information SEE COMMENT  Comment:  This screen includes the following classes of drugs at the   listed cut-off:  Benzodiazepines                  200 ng/ml  Methadone                        300 ng/ml  Cocaine metabolite               300 ng/ml  Opiates                          300 ng/ml  Barbiturates                     200 ng/ml  Amphetamines                    1000 ng/ml  Marijuana metabs (THC)            50 ng/ml  Phencyclidine (PCP)               25 ng/ml  High concentrations of Diphenhydramine may cross-react with  Phencyclidine PCP screening immunoassay giving a false   positive result.  High concentrations of Methylenedioxymethamphetamine (MDMA aka  Ectasy) and other structurally similar compounds may cross-   react with the Amphetamine/Methamphetamine screening   immunoassay giving a false positive result.  A metabolite of the anti-HIV drug Sustiva () may cause  false positive results in the Marijuana metabolite (THC)   screening assay.  Note: This exception list includes only more common   interferants in toxicology screen testing.  Because of many   cross-reactantspositive results on toxicology drug screens   should be confirmed whenever results do not correlate with   clinical presentation.  This report is intended for use in clinical monitoring and  management of patients. It is not intended for use in   employment related drug testing.  Because of any cross-reactants, positive results on toxicology  drug screens should be confirmed  whenever results do not  correlate with clinical presentation.  Presumptive positive results are unconfirmed and may be used   only for medical purposes.        TSH  0.464     Urobilinogen, UA Negative      WBC  4.52           Significant Imaging: I have reviewed all pertinent imaging results/findings within the past 24 hours.    Assessment/Plan:     Bipolar I disorder with shamika    Bipolar Disorder, Manic Episode  - Recommend to continue PEC and seek inpatient psychiatric facility admission  - PRNs for non- redirectable anxiety and agitation: Haldol 5 mg PO/IM and Ativan 2 mg PO/IM Q6                 Total Time:  45 minutes     Camila Chery MD   Psychiatry  [facility]

## 2018-07-02 NOTE — ED NOTES
Patient accepted by Leticia at [facility] ([address] [place] [address]) for the service of Dr. Garcia, Report to be called to [phone].

## 2018-07-02 NOTE — ED NOTES
LOC: The patient is awake, alert, and oriented to place, time, situation. Affect is appropriate.  Speech is appropriate and clear.     APPEARANCE: Patient resting comfortably in no acute distress.  Patient is clean and well groomed.    SKIN: The skin is warm and dry; color consistent with ethnicity.  Patient has normal skin turgor and moist mucus membranes.  Skin intact; no breakdown or bruising noted.     MUSCULOSKELETAL: Patient moving upper and lower extremities without difficulty.  Denies weakness.     RESPIRATORY: Airway is open and patent. Respirations spontaneous, even, easy, and non-labored.  Patient has a normal effort and rate.  No accessory muscle use noted. Denies cough.     CARDIAC:  Normal rhythm and rate noted.  No peripheral edema noted. No complaints of chest pain.      ABDOMEN: Soft and non tender to palpation.  No distention noted.     NEUROLOGIC: Eyes open spontaneously.  Behavior appropriate to situation.  Follows commands; facial expression symmetrical.  Purposeful motor response noted; normal sensation in all extremities.

## 2018-07-02 NOTE — ED PROVIDER NOTES
"Encounter Date: 2018    SCRIBE #1 NOTE: I, Natalie Muse, am scribing for, and in the presence of,  Dr. Allen. I have scribed the following portions of the note - Other sections scribed: HPI ROS PE.       History     Chief Complaint   Patient presents with    Arm Injury     Pt presented to the ED via pov. Pt c/o right arm pain d/t an old injury. Pt alert. Pt manic.      Time patient was seen by the provider: 12:09 PM      The patient is a 42 y.o. female who presents to the ED with a complaint of multiple surgeries "coming undone." She states that she has "balls in her arms" that are making her move in bazaar fashions, fractures from her frontal bone to the humerus in her right arm. She also states that the nerves in her arm have deviated and is now wrapped around her brain. Patient is tearful, anxious, "tired of being atagnoized as a drug abuser", only takes vitamin D and tizanidine. Non compliant with psychiatric medications. History of shoulder surgery, tummy tuck, , smokes weed, - weakness, - numbness, - fevers, - chills.      The history is provided by the patient and medical records.     Review of patient's allergies indicates:   Allergen Reactions    Avelox [moxifloxacin] Anaphylaxis     Past Medical History:   Diagnosis Date    Anxiety     Depression     Hypertension      Past Surgical History:   Procedure Laterality Date    ABDOMINAL SURGERY       SECTION      choleycystectomy      COSMETIC SURGERY      HYSTERECTOMY      ROTATOR CUFF REPAIR       History reviewed. No pertinent family history.  Social History   Substance Use Topics    Smoking status: Never Smoker    Smokeless tobacco: Never Used    Alcohol use No     Review of Systems   Unable to perform ROS: Psychiatric disorder       Physical Exam     Initial Vitals [18 1122]   BP Pulse Resp Temp SpO2   137/72 98 17 98.3 °F (36.8 °C) 100 %      MAP       --         Physical Exam    Vitals reviewed.  HENT: "   Head: Normocephalic and atraumatic.   Eyes: Conjunctivae are normal. Pupils are equal, round, and reactive to light.   Neck: Normal range of motion. Neck supple.   Cardiovascular: Intact distal pulses.   Mildly tachycardic   Pulmonary/Chest: Breath sounds normal. No respiratory distress.   Abdominal: Soft. She exhibits no distension. There is no tenderness.   Musculoskeletal:   FROM to right shoulder, patient crawled on the floor and started doing calisthenics   Neurological: She is alert and oriented to person, place, and time. She has normal strength.   Skin: Skin is warm and dry.   Psychiatric: Her speech is rapid and/or pressured. She is agitated and hyperactive. She is not actively hallucinating. Thought content is not paranoid and not delusional. She expresses no homicidal and no suicidal ideation.   Flight of ideas, bizarre psychomotor movements, easily redirectable         ED Course   Procedures  Labs Reviewed   CBC W/ AUTO DIFFERENTIAL - Abnormal; Notable for the following:        Result Value    Hematocrit 36.9 (*)     Platelets 352 (*)     All other components within normal limits   COMPREHENSIVE METABOLIC PANEL - Abnormal; Notable for the following:     Potassium 3.4 (*)     All other components within normal limits   URINALYSIS - Abnormal; Notable for the following:     Appearance, UA Hazy (*)     All other components within normal limits   ACETAMINOPHEN LEVEL - Abnormal; Notable for the following:     Acetaminophen (Tylenol), Serum <3.0 (*)     All other components within normal limits   SALICYLATE LEVEL - Abnormal; Notable for the following:     Salicylate Lvl <5.0 (*)     All other components within normal limits   TSH   DRUG SCREEN PANEL, URINE EMERGENCY   ALCOHOL,MEDICAL (ETHANOL)   CK     EKG Readings: (Independently Interpreted)   Initial Reading: No STEMI. Rhythm: Sinus Bradycardia. Heart Rate: 59. Ectopy: No Ectopy. Conduction: Normal. ST Segments: Normal ST Segments. T Waves: Normal. Axis:  Normal. Clinical Impression: Sinus Bradycardia       Imaging Results    None          Medical Decision Making:   ED Management:  Medically cleared. Declined potassium.            Scribe Attestation:   Scribe #1: I performed the above scribed service and the documentation accurately describes the services I performed. I attest to the accuracy of the note.               Clinical Impression:   The primary encounter diagnosis was Acute psychosis. Diagnoses of Medical clearance for psychiatric admission and Shoulder pain were also pertinent to this visit.                             Paresh Allen MD  07/02/18 1427       Paresh Allen MD  07/02/18 4827

## 2018-07-02 NOTE — ED NOTES
Patient arrived to the [facility] from the ED.  Accompanied by ed staff and security.  Patient is wrapped up in a blanket over her head.  She allow writer to take vital signs but would not really engage in conversation.  She states they gave her a shot that she did not want in the ed.  Attempted to review plan of care with patient but she put the blanket back over her head and said she is not going to any psych facility.  Baljit had a bag of belongings that were put in the locked closet.

## 2018-07-02 NOTE — ED NOTES
"LOC: The patient is awake, alert, and oriented to place, time, situation. Affect is appropriate.  Speech is appropriate and clear.     APPEARANCE: Patient resting comfortably in no acute distress.  Patient is clean and well groomed.    SKIN: The skin is warm and dry; color consistent with ethnicity.  Patient has normal skin turgor and moist mucus membranes.  Skin intact; no breakdown or bruising noted.     MUSCULOSKELETAL: Patient moving upper and lower extremities without difficulty.  Denies weakness. Pt c/o right sided back pain, under right scapula. Pt able to move extremity. Pt denies any numbness or tingling anywhere. All PMS intact. Cap refill less than 3 seconds in RUE.     RESPIRATORY: Airway is open and patent. Respirations spontaneous, even, easy, and non-labored.  Patient has a normal effort and rate.  No accessory muscle use noted. Denies cough.     CARDIAC:  Normal rhythm and rate noted.  No peripheral edema noted. No complaints of chest pain.      ABDOMEN: Soft and non tender to palpation.  No distention noted.     NEUROLOGIC: Eyes open spontaneously. Follows commands; facial expression symmetrical.  Purposeful motor response noted; normal sensation in all extremities. Pt flight of ideas and tearful during triage. Speech rapid and pressured. Pt states "Thesupression of my medication is causing my pelvic to move from where it's supposed to be" and "There are arm nerves tat are wrapping around my brain" and "There are balls in my arms that are forcing me to move my arms".  Pt c/o right sided back pain. Pt denies any SI or HI at this time. Pt states she is not taking any of her prescribed medications. Pt denies any depression or anxiety at this time. Pt A&O x4 during triage. Pt talking about multiple c/o and unable to stay on track despite redirection.        "

## 2018-07-02 NOTE — ASSESSMENT & PLAN NOTE
Bipolar Disorder, Manic Episode  - Recommend to continue PEC and seek inpatient psychiatric facility admission  - PRNs for non- redirectable anxiety and agitation: Haldol 5 mg PO/IM and Ativan 2 mg PO/IM Q6

## 2018-07-02 NOTE — ED NOTES
PEC received in [facility].  Awaiting lab results and medical clearance for psych placement.

## 2018-07-02 NOTE — ED NOTES
Remains oriented x 3 skin warm dry refuses oral med potassium states she will eat fruits to bring her potassium up PSA  At bedside awaiting xray

## 2018-07-03 PROBLEM — F60.3 BORDERLINE PERSONALITY DISORDER IN ADULT: Chronic | Status: ACTIVE | Noted: 2018-07-02

## 2018-07-03 PROBLEM — F31.2 BIPOLAR DISORDER, CURRENT EPISODE MANIC SEVERE WITH PSYCHOTIC FEATURES: Chronic | Status: ACTIVE | Noted: 2018-07-02

## 2018-07-03 PROBLEM — I10 HYPERTENSION: Chronic | Status: ACTIVE | Noted: 2018-07-03

## 2018-07-03 NOTE — ED NOTES
Patient transferred to [facility] by [facility] with belongings. Report was given by previous nurse to [facility]. Vitals stable. Security present for transfer to [facility]'s Van. Patient did not want contact person called to inform of transfer. SVC maintained. Spoke to nurse Lonnie and informed patient in route he confirmed report was given.

## 2018-07-13 ENCOUNTER — TELEPHONE (OUTPATIENT)
Dept: PAIN MEDICINE | Facility: CLINIC | Age: 43
End: 2018-07-13

## 2018-07-13 ENCOUNTER — OFFICE VISIT (OUTPATIENT)
Dept: NEUROLOGY | Facility: CLINIC | Age: 43
End: 2018-07-13
Payer: MEDICARE

## 2018-07-13 ENCOUNTER — HOSPITAL ENCOUNTER (OUTPATIENT)
Dept: RADIOLOGY | Facility: HOSPITAL | Age: 43
Discharge: HOME | End: 2018-07-13
Attending: PSYCHIATRY & NEUROLOGY
Payer: MEDICARE

## 2018-07-13 VITALS
SYSTOLIC BLOOD PRESSURE: 142 MMHG | HEART RATE: 62 BPM | BODY MASS INDEX: 22.31 KG/M2 | WEIGHT: 118.19 LBS | HEIGHT: 61 IN | DIASTOLIC BLOOD PRESSURE: 88 MMHG

## 2018-07-13 DIAGNOSIS — F31.2 BIPOLAR AFFECTIVE DISORDER, CURRENTLY MANIC, SEVERE, WITH PSYCHOTIC FEATURES: ICD-10-CM

## 2018-07-13 DIAGNOSIS — G44.89 CHRONIC MIXED HEADACHE SYNDROME: ICD-10-CM

## 2018-07-13 DIAGNOSIS — R51.9 INTRACTABLE HEADACHE, UNSPECIFIED CHRONICITY PATTERN, UNSPECIFIED HEADACHE TYPE: Primary | ICD-10-CM

## 2018-07-13 PROCEDURE — 99214 OFFICE O/P EST MOD 30 MIN: CPT | Mod: PBBFAC,25 | Performed by: PSYCHIATRY & NEUROLOGY

## 2018-07-13 PROCEDURE — 99999 PR PBB SHADOW E&M-EST. PATIENT-LVL IV: CPT | Mod: PBBFAC,,, | Performed by: PSYCHIATRY & NEUROLOGY

## 2018-07-13 PROCEDURE — 70551 MRI BRAIN STEM W/O DYE: CPT | Mod: TC

## 2018-07-13 PROCEDURE — 99205 OFFICE O/P NEW HI 60 MIN: CPT | Mod: S$PBB,,, | Performed by: PSYCHIATRY & NEUROLOGY

## 2018-07-13 PROCEDURE — 70551 MRI BRAIN STEM W/O DYE: CPT | Mod: 26,,, | Performed by: RADIOLOGY

## 2018-07-13 NOTE — TELEPHONE ENCOUNTER
----- Message from Sangeeta Velzaquez sent at 7/13/2018 11:03 AM CDT -----  Contact: Pt  ---FST Request---    Name of Who is Calling: JAYLAN NARANJO [[id number]]    What is the request in detail: Patient states she was referred by neurology patient states she is experiencing pain in the back of her head patient is requesting to be seen today......Please contact to further discuss and advise     Can the clinic reply by [facility]: No      What Number to Call Back if not in [facility]: [phone]

## 2018-07-13 NOTE — PROGRESS NOTES
[facility] - NEUROLOGY  [facility] [place]    Date: 2018   Patient Name: Constance Seaman   MRN: [id number]   PCP: Sebastian De Leon  Referring Provider: Self, Aaareferral    Assessment:      This is Constance Seaman, 42 y.o. female with uncontrolled bipolar disorder and migraines.     Plan:      -  MRI brain  -  Follow up with primary care and Dr. Moreno for stadol       I discussed side effects of the medications. I asked the patient to stop the medication if she notices serious adverse effects as we discussed and to seek immediate medical attention at an ER.     Fawad Painter MD  [facility]   Department of Neurology    Subjective:      HPI:   Ms. Constance Seaman is a 42 y.o. female who presents with a chief complaint of headache    History limited as patient is floridly manic with profoundly disorganized and tangential thought process, not currently taking any psych meds despite recent hospitalization.  Reports years of right sided headaches with nausea and photo/phonophobia.  She states that stadol is the only thing that alleviates her headaches and does not desire any other treatment.    PAST MEDICAL HISTORY:  Past Medical History:   Diagnosis Date    Anxiety     Depression     History of psychiatric hospitalization     Hx of psychiatric care     Hypertension     Faviola     Psychiatric problem        PAST SURGICAL HISTORY:  Past Surgical History:   Procedure Laterality Date    ABDOMINAL SURGERY       SECTION      choleycystectomy      COSMETIC SURGERY      HYSTERECTOMY      ROTATOR CUFF REPAIR         CURRENT MEDS:  Current Outpatient Prescriptions   Medication Sig Dispense Refill    amLODIPine (NORVASC) 5 MG tablet Take 1 tablet (5 mg total) by mouth once daily. 30 tablet 0    butorphanol (STADOL) 10 mg/mL nasal spray 1 spray by Nasal route every 4 (four) hours as needed for Pain.      tiZANidine (ZANAFLEX) 4 MG tablet Take 4  "mg by mouth every 8 (eight) hours as needed.      lorazepam (ATIVAN) 0.5 MG tablet Take 1 tablet (0.5 mg total) by mouth every 6 (six) hours as needed for Anxiety. 12 tablet 0     No current facility-administered medications for this visit.        ALLERGIES:  Review of patient's allergies indicates:   Allergen Reactions    Avelox [moxifloxacin] Anaphylaxis       FAMILY HISTORY:  No family history on file.    SOCIAL HISTORY:  Social History   Substance Use Topics    Smoking status: Never Smoker    Smokeless tobacco: Never Used    Alcohol use No      Comment: denies       Review of Systems:  12 review of systems is negative except for the symptoms mentioned in HPI.        Objective:     Vitals:    07/13/18 0947   BP: (!) 142/88   Pulse: 62   Weight: 53.6 kg (118 lb 2.7 oz)   Height: 5' 1" (1.549 m)       General: NAD, well nourished   Eyes: no tearing, discharge, no erythema   ENT: moist mucous membranes of the oral cavity, nares patent    Neck: Supple, full range of motion  Cardiovascular: Warm and well perfused, pulses equal and symmetrical  Lungs: Normal work of breathing, normal chest wall excursions  Skin: No rash, lesions, or breakdown on exposed skin  Psychiatry: Mood and affect are appropriate   Abdomen: soft, non tender, non distended  Extremeties: No cyanosis, clubbing or edema.    Neurological   MENTAL STATUS: Alert and oriented to person, place, and time. Speech without dysarthria.  CRANIAL NERVES: Visual fields intact. PERRL. EOMI. Facial sensation intact. Face symmetrical. Hearing grossly intact. Full shoulder shrug bilaterally. Tongue protrudes midline   SENSORY: Sensation is intact to light touch throughout.  Negative Romberg.   MOTOR: Normal bulk and tone. No pronator drift.  5/5 deltoid, biceps, triceps, interosseous, hand  bilaterally. 5/5 iliopsoas, knee extension/flexion, foot dorsi/plantarflexion bilaterally.    REFLEXES: Symmetric and 2+ throughout.   CEREBELLAR/COORDINATION/GAIT: Gait " steady with normal arm swing and stride length.  Heel to shin intact. Finger to nose intact. Normal rapid alternating movements.

## 2018-07-13 NOTE — TELEPHONE ENCOUNTER
Contacted and spoke to patient regarding her request for same day appointment.     She was informed that there are no physicians in clinic today and the mid -levels don't see new patients, she was informed that we can schedule her for a sooner appointment.     Patient agreed and a appointment was scheduled to her satisfaction.

## 2018-07-14 ENCOUNTER — PATIENT MESSAGE (OUTPATIENT)
Dept: NEUROLOGY | Facility: CLINIC | Age: 43
End: 2018-07-14

## 2018-07-16 ENCOUNTER — PATIENT MESSAGE (OUTPATIENT)
Dept: NEUROLOGY | Facility: CLINIC | Age: 43
End: 2018-07-16

## 2018-07-24 ENCOUNTER — OFFICE VISIT (OUTPATIENT)
Dept: PAIN MEDICINE | Facility: CLINIC | Age: 43
End: 2018-07-24
Attending: ANESTHESIOLOGY
Payer: MEDICARE

## 2018-07-24 VITALS
DIASTOLIC BLOOD PRESSURE: 86 MMHG | HEIGHT: 61 IN | HEART RATE: 81 BPM | WEIGHT: 112.88 LBS | TEMPERATURE: 98 F | OXYGEN SATURATION: 100 % | BODY MASS INDEX: 21.31 KG/M2 | SYSTOLIC BLOOD PRESSURE: 136 MMHG | RESPIRATION RATE: 18 BRPM

## 2018-07-24 DIAGNOSIS — M54.81 OCCIPITAL NEURALGIA OF RIGHT SIDE: Primary | ICD-10-CM

## 2018-07-24 DIAGNOSIS — M79.18 MYOFASCIAL PAIN: ICD-10-CM

## 2018-07-24 PROCEDURE — 99205 OFFICE O/P NEW HI 60 MIN: CPT | Mod: 25,S$PBB,, | Performed by: ANESTHESIOLOGY

## 2018-07-24 PROCEDURE — 64405 NJX AA&/STRD GR OCPL NRV: CPT | Mod: PBBFAC | Performed by: ANESTHESIOLOGY

## 2018-07-24 PROCEDURE — 99213 OFFICE O/P EST LOW 20 MIN: CPT | Mod: PBBFAC | Performed by: ANESTHESIOLOGY

## 2018-07-24 PROCEDURE — 99999 PR PBB SHADOW E&M-EST. PATIENT-LVL III: CPT | Mod: PBBFAC,,, | Performed by: ANESTHESIOLOGY

## 2018-07-24 PROCEDURE — 64405 NJX AA&/STRD GR OCPL NRV: CPT | Mod: 50,S$PBB,, | Performed by: ANESTHESIOLOGY

## 2018-07-24 RX ORDER — BETAMETHASONE SODIUM PHOSPHATE AND BETAMETHASONE ACETATE 3; 3 MG/ML; MG/ML
6 INJECTION, SUSPENSION INTRA-ARTICULAR; INTRALESIONAL; INTRAMUSCULAR; SOFT TISSUE
Status: COMPLETED | OUTPATIENT
Start: 2018-07-24 | End: 2018-07-24

## 2018-07-24 RX ORDER — CHOLECALCIFEROL (VITAMIN D3) 25 MCG
1000 TABLET ORAL DAILY
COMMUNITY

## 2018-07-24 RX ADMIN — BETAMETHASONE ACETATE AND BETAMETHASONE SODIUM PHOSPHATE 6 MG: 3; 3 INJECTION, SUSPENSION INTRA-ARTICULAR; INTRALESIONAL; INTRAMUSCULAR; SOFT TISSUE at 09:07

## 2018-07-24 NOTE — PROGRESS NOTES
Subjective:      Patient ID: Constance Seaman is a 42 y.o. female.    Chief Complaint: Headache (New patient)    Referred by: Self, Aaareferral     Pain Scales  Best: 1/10  Worst: 10/10  Usually: 7/10  Today: 8/10    Headache    Pertinent negatives include no back pain, blurred vision, coughing, dizziness, ear pain, eye pain, fever, loss of balance, seizures, vomiting or weight loss.   1 10 7 8     Interventional Pain History  ***    Review of Systems   Constitution: Negative for chills, fever, malaise/fatigue, weight gain and weight loss.   HENT: Negative for ear pain and hoarse voice.    Eyes: Negative for blurred vision, pain and visual disturbance.   Cardiovascular: Negative for chest pain, dyspnea on exertion and irregular heartbeat.   Respiratory: Negative for cough, shortness of breath and wheezing.    Endocrine: Negative for cold intolerance and heat intolerance.   Hematologic/Lymphatic: Negative for adenopathy and bleeding problem. Does not bruise/bleed easily.   Skin: Negative for color change, itching and rash.   Musculoskeletal: Negative for back pain.   Gastrointestinal: Negative for change in bowel habit, diarrhea, hematemesis, hematochezia, melena and vomiting.   Genitourinary: Negative for flank pain, frequency, hematuria and urgency.   Neurological: Positive for headaches. Negative for difficulty with concentration, dizziness, loss of balance and seizures.   Psychiatric/Behavioral: Negative for altered mental status, depression and suicidal ideas. The patient is not nervous/anxious.    Allergic/Immunologic: Negative for HIV exposure.           Objective:      ***    Ortho/SPM Exam      Assessment:       No diagnosis found.      Plan:       There are no diagnoses linked to this encounter.     ***

## 2018-07-24 NOTE — PROGRESS NOTES
Subjective:      Patient ID: Constance Seaman is a 42 y.o. female.    Chief Complaint: Headache (New patient)    Referred by: Self, Aaareferral     Pain Scales  Best: 1/10  Worst: 10/10  Usually: 7/10  Today: 8/10    Headache    Pertinent negatives include no back pain, blurred vision, coughing, dizziness, ear pain, eye pain, fever, loss of balance, seizures, vomiting or weight loss.     HPI  42yF here today for HA. She states she has been having HAs for 2 years now. She states they started from when she injured her R shoulder. She injured her R shoulder when working out. Now having HA on R back to side of head. HAs come and go and sometimes seem constant. They feel like an ache and burning on occasion radiating from R suboccipital area and around R ear. Furthermore they seem to start in R upper shoulder/neck area and radiate to head. Denies WHOL. Denies fevers, chills, vision changes, weakness, B/B changes, CP, SOB, N/V/D, abd pain.     Interventional Pain History  None    Review of Systems   Constitution: Negative for chills, fever, malaise/fatigue, weight gain and weight loss.   HENT: Negative for ear pain and hoarse voice.    Eyes: Negative for blurred vision, pain and visual disturbance.   Cardiovascular: Negative for chest pain, dyspnea on exertion and irregular heartbeat.   Respiratory: Negative for cough, shortness of breath and wheezing.    Endocrine: Negative for cold intolerance and heat intolerance.   Hematologic/Lymphatic: Negative for adenopathy and bleeding problem. Does not bruise/bleed easily.   Skin: Negative for color change, itching and rash.   Musculoskeletal: Negative for back pain.   Gastrointestinal: Negative for change in bowel habit, diarrhea, hematemesis, hematochezia, melena and vomiting.   Genitourinary: Negative for flank pain, frequency, hematuria and urgency.   Neurological: Positive for headaches. Negative for difficulty with concentration, dizziness, loss of balance and  seizures.   Psychiatric/Behavioral: Negative for altered mental status, depression and suicidal ideas. The patient is not nervous/anxious.    Allergic/Immunologic: Negative for HIV exposure.     Past Medical History:   Diagnosis Date    Anxiety     Depression     History of psychiatric hospitalization     Hx of psychiatric care     Hypertension     Faviola     Psychiatric problem        Past Surgical History:   Procedure Laterality Date    ABDOMINAL SURGERY       SECTION      choleycystectomy      COSMETIC SURGERY      HYSTERECTOMY      ROTATOR CUFF REPAIR           Current Outpatient Prescriptions:     amLODIPine (NORVASC) 5 MG tablet, Take 1 tablet (5 mg total) by mouth once daily., Disp: 30 tablet, Rfl: 0    tiZANidine (ZANAFLEX) 4 MG tablet, Take 4 mg by mouth every 8 (eight) hours as needed., Disp: , Rfl:     vitamin D 1000 units Tab, Take 1,000 Units by mouth once daily., Disp: , Rfl:     lorazepam (ATIVAN) 0.5 MG tablet, Take 1 tablet (0.5 mg total) by mouth every 6 (six) hours as needed for Anxiety., Disp: 12 tablet, Rfl: 0  No current facility-administered medications for this visit.     Social History     Social History    Marital status:      Spouse name: N/A    Number of children: N/A    Years of education: N/A     Occupational History    unemployed      Social History Main Topics    Smoking status: Never Smoker    Smokeless tobacco: Never Used    Alcohol use No      Comment: denies    Drug use: Yes     Types: Marijuana      Comment: twice daily    Sexual activity: Yes     Birth control/ protection: Abstinence     Other Topics Concern    Patient Feels They Ought To Cut Down On Drinking/Drug Use No    Patient Annoyed By Others Criticizing Their Drinking/Drug Use Yes    Patient Has Felt Bad Or Guilty About Drinking/Drug Use No    Patient Has Had A Drink/Used Drugs As An Eye Opener In The Am Yes     Social History Narrative    No narrative on file       Review of  "patient's allergies indicates:   Allergen Reactions    Avelox [moxifloxacin] Anaphylaxis    Buspirone Photosensitivity           Objective:      /86   Pulse 81   Temp 98.1 °F (36.7 °C) (Oral)   Resp 18   Ht 5' 1" (1.549 m)   Wt 51.2 kg (112 lb 14 oz)   SpO2 100%   BMI 21.33 kg/m²   PHYSICAL EXAMINATION:  GEN:  Well developed, well nourished.  No acute distress. Normal pain behavior.  HEENT:  No trauma.  Mucous membranes moist.  Nares patent bilaterally.  PSYCH: Normal affect with some anxiety. Thought content somewhat hurried.  CHEST:  Breathing symmetric.  No audible wheezing.  ABD: Soft, non-tender, non-distended.  SKIN:  Warm, pink, dry.  No rash on exposed areas.    EXT:  No cyanosis, clubbing, or edema.  No color change or changes in nail or hair growth.    NEURO/MUSCULOSKELETAL:  Fully alert, oriented, and appropriate. Speech normal bebeto. No cranial nerve deficits.   Gait: Normal.  No trendelenburg sign bilaterally.   Strength: 5/5 motor strength throughout bilateral upper and lower extremities myotomes.   Sensory: No sensory deficit in the lower extremities dermatomes.   Reflexes:  2+ and symmetric throughout. Negative barclay's.  No clonus or spasticity.    C-Spine:  Normal ROM without pain. - facet loading bilaterally.  - Spurling's bilaterally.   TTP over R descending trap and suboccipital musculature with reproduction of pain . No TTP of shoulders, elbows or hands.        Ortho/SPM Exam      Assessment:       Encounter Diagnoses   Name Primary?    Occipital neuralgia of right side Yes    Myofascial pain          Plan:       Constance was seen today for headache.    Diagnoses and all orders for this visit:    Occipital neuralgia of right side  -     betamethasone acetate-betamethasone sodium phosphate injection 6 mg; Inject 1 mL (6 mg total) into the muscle one time.    Myofascial pain         We discussed with the patient the assessment and recommendations. The following is the plan we " agreed on:    1) Minimal HTN on exam today. Continue close FU with PCP.   2) R occipital nerve blocks done. Consent discussed and signed. See procedure note below.  3) RTC PRN    David Louise, DO  [employer] [facility] PGYIII    PROCEDURE: Under sterile technique & after discussing with the patient, right occipital nerve block targeting greater & lesser occipital nerves done using 3 mL of medications from mixture of bupivacaine 0.25% 10 mL and betamethasone 0.4 mL. The patient tolerated procedure well.      I have personally taken the history and examined this patient and agree with the resident's note as stated above.

## 2018-07-24 NOTE — PROGRESS NOTES
HPI    42yF here today for HA. She states she has been having HAs for 2 years now. She states they started from when she injured her R shoulder. She injured her R shoulder when working out. Now having HA on R side of head and R face. HA are constant. They feel like an ache and burning on occasion radiating from R suboccipital area and around R ear.